# Patient Record
Sex: FEMALE | Race: WHITE | URBAN - METROPOLITAN AREA
[De-identification: names, ages, dates, MRNs, and addresses within clinical notes are randomized per-mention and may not be internally consistent; named-entity substitution may affect disease eponyms.]

---

## 2022-07-09 ENCOUNTER — TELEPHONE ENCOUNTER (OUTPATIENT)
Dept: URBAN - METROPOLITAN AREA CLINIC 121 | Facility: CLINIC | Age: 72
End: 2022-07-09

## 2022-07-09 RX ORDER — MV-MIN/FOLIC/VIT K/LYCOP/COQ10 200-100MCG
CAPSULE ORAL
Refills: 0 | OUTPATIENT
Start: 2016-02-08 | End: 2016-04-21

## 2022-07-09 RX ORDER — BISMUTH SUBCITRATE POTASSIUM, METRONIDAZOLE, TETRACYCLINE HYDROCHLORIDE 140; 125; 125 MG/1; MG/1; MG/1
TAKE AS DIRECTED CAPSULE ORAL TAKE AS DIRECTED
Refills: 0 | OUTPATIENT
Start: 2016-07-19 | End: 2016-09-07

## 2022-07-09 RX ORDER — OMEPRAZOLE 20 MG/1
TABLET, DELAYED RELEASE ORAL TWICE A DAY
Refills: 0 | OUTPATIENT
Start: 2018-03-05 | End: 2018-06-12

## 2022-07-09 RX ORDER — OMEPRAZOLE 20 MG/1
ONCE A DAY CAPSULE, DELAYED RELEASE ORAL ONCE A DAY
Refills: 1 | OUTPATIENT
Start: 2016-02-08 | End: 2016-04-01

## 2022-07-09 RX ORDER — MV-MIN/FOLIC/VIT K/LYCOP/COQ10 200-100MCG
CAPSULE ORAL
Refills: 0 | OUTPATIENT
Start: 2016-04-21 | End: 2016-07-11

## 2022-07-09 RX ORDER — OMEPRAZOLE 20 MG/1
ONCE A DAY CAPSULE, DELAYED RELEASE ORAL ONCE A DAY
Refills: 0 | OUTPATIENT
Start: 2016-09-07 | End: 2018-03-05

## 2022-07-09 RX ORDER — DM/PE/ACETAMINOPHEN/CHLORPHENR 10-5-325-2
TABLET, SEQUENTIAL ORAL
Refills: 0 | OUTPATIENT
Start: 2016-04-21 | End: 2016-07-11

## 2022-07-09 RX ORDER — OMEPRAZOLE 20 MG/1
ONCE A DAY CAPSULE, DELAYED RELEASE ORAL ONCE A DAY
Refills: 1 | OUTPATIENT
Start: 2016-04-01 | End: 2016-09-07

## 2022-07-09 RX ORDER — BISMUTH SUBCITRATE POTASSIUM, METRONIDAZOLE, TETRACYCLINE HYDROCHLORIDE 140; 125; 125 MG/1; MG/1; MG/1
CAPSULE ORAL
Refills: 0 | OUTPATIENT
Start: 2016-09-07 | End: 2016-12-27

## 2022-07-09 RX ORDER — DM/PE/ACETAMINOPHEN/CHLORPHENR 10-5-325-2
TABLET, SEQUENTIAL ORAL
Refills: 0 | OUTPATIENT
Start: 2016-02-08 | End: 2016-04-21

## 2022-07-09 RX ORDER — OMEPRAZOLE 20 MG/1
TABLET, DELAYED RELEASE ORAL
Refills: 0 | OUTPATIENT
Start: 2016-07-11 | End: 2016-09-07

## 2022-07-09 RX ORDER — OMEPRAZOLE 20 MG/1
TABLET, DELAYED RELEASE ORAL
Refills: 0 | OUTPATIENT
Start: 2016-04-21 | End: 2016-07-11

## 2022-07-10 ENCOUNTER — TELEPHONE ENCOUNTER (OUTPATIENT)
Dept: URBAN - METROPOLITAN AREA CLINIC 121 | Facility: CLINIC | Age: 72
End: 2022-07-10

## 2022-07-10 RX ORDER — OMEPRAZOLE 20 MG/1
PRN TABLET, DELAYED RELEASE ORAL
Refills: 0 | Status: ACTIVE | COMMUNITY
Start: 2018-06-12

## 2023-10-04 ENCOUNTER — OFFICE VISIT (OUTPATIENT)
Dept: URBAN - METROPOLITAN AREA CLINIC 60 | Facility: CLINIC | Age: 73
End: 2023-10-04
Payer: COMMERCIAL

## 2023-10-04 ENCOUNTER — WEB ENCOUNTER (OUTPATIENT)
Dept: URBAN - METROPOLITAN AREA CLINIC 60 | Facility: CLINIC | Age: 73
End: 2023-10-04

## 2023-10-04 VITALS
SYSTOLIC BLOOD PRESSURE: 140 MMHG | BODY MASS INDEX: 26.8 KG/M2 | RESPIRATION RATE: 20 BRPM | WEIGHT: 157 LBS | HEART RATE: 69 BPM | TEMPERATURE: 97.9 F | DIASTOLIC BLOOD PRESSURE: 80 MMHG | OXYGEN SATURATION: 93 % | HEIGHT: 64 IN

## 2023-10-04 DIAGNOSIS — K51.80 OTHER ULCERATIVE COLITIS WITHOUT COMPLICATION: ICD-10-CM

## 2023-10-04 DIAGNOSIS — D12.6 ADENOMATOUS POLYP OF COLON, UNSPECIFIED PART OF COLON: ICD-10-CM

## 2023-10-04 DIAGNOSIS — K57.30 DIVERTICULAR DISEASE OF COLON: ICD-10-CM

## 2023-10-04 DIAGNOSIS — Z86.010 PERSONAL HISTORY OF COLONIC POLYPS: ICD-10-CM

## 2023-10-04 PROBLEM — 733657002: Status: ACTIVE | Noted: 2023-10-04

## 2023-10-04 PROBLEM — 64766004: Status: ACTIVE | Noted: 2023-10-04

## 2023-10-04 PROCEDURE — 99204 OFFICE O/P NEW MOD 45 MIN: CPT | Performed by: NURSE PRACTITIONER

## 2023-10-04 RX ORDER — MESALAMINE 375 MG/1
4 CAPSULES IN THE MORNING CAPSULE, EXTENDED RELEASE ORAL ONCE A DAY
Qty: 360 CAPSULE | Refills: 0 | OUTPATIENT
Start: 2023-10-04 | End: 2023-11-03

## 2023-10-04 RX ORDER — MESALAMINE 375 MG/1
4 CAPSULES IN THE MORNING CAPSULE, EXTENDED RELEASE ORAL ONCE A DAY
Status: ACTIVE | COMMUNITY

## 2023-10-04 RX ORDER — ALENDRONATE SODIUM 70 MG/1
1 TABLET 30 MINUTES BEFORE THE FIRST FOOD, BEVERAGE OR MEDICINE OF THE DAY WITH PLAIN WATER TABLET ORAL
Status: ACTIVE | COMMUNITY

## 2023-10-04 RX ORDER — OMEPRAZOLE 20 MG/1
PRN TABLET, DELAYED RELEASE ORAL
Refills: 0 | Status: ACTIVE | COMMUNITY
Start: 2018-06-12

## 2023-10-04 RX ORDER — MEMANTINE HYDROCHLORIDE 5 MG/1
1 TABLET TABLET ORAL ONCE A DAY
Status: ACTIVE | COMMUNITY

## 2023-10-04 NOTE — PHYSICAL EXAM HENT:
Head,  normocephalic,  atraumatic,  Face, within normal limits,  Ears,  External ears within normal limits,  Nose/Nasopharynx,  External nose  normal appearance, no nasal discharge,  Mouth and Throat,  Oral cavity appearance normal. Mucosa normal. Lips,  Appearance normal 
Clothing

## 2023-10-04 NOTE — HPI-TODAY'S VISIT:
Patient with colonoscopy positive for tubulovillous adenoma in the rectum, diverticular disease, proctitis and internal hemorrhoids, random biopsy of the  colon negative for colitis. Patient EGD positive for H hernia, reflux and H Pylori gastritis. Today good general state, with the complaint above. Patient with allergy to penicillin so will do treatment with Pylera and labs. Patient with likely IBD will need surveillance colonoscopy in 1-2 years.  7/16:  Patient with evidence of proctitis will need labs of IBD and will continue for now with Pentasa, will do labs and will follow closely, will need a colonoscopy in 1 year from the last one. Patient with gastritis positive for H pylori, did not take her treatment will try again, will follow in 2 months.  9/16:  Patient  Comes with improvement of her symptoms after H pylori treatment, will do urea breath test and will follow results, patient denies diarrhea or bleeding having regular bowel moments. Will follow in 2 months.  12/16 Patient had urea breath test that is negative. Patient doing well, will decrease dose of omeprazole. Will follow in few months.Will plan colonoscopy. Will give treatment for UC.  3/18: Patient states doing well with prn PPI, and on mesalamine. But did not have a colonoscopy as recommended on 2016 and had a test of occult blood that is positive. Will plan colonoscopy.  Will consider Lialda. Patient with low dose.  6/18:  Patient had a colonoscopy with evidence of colitis at the sigmoid colon, normal mucosa at the rectum and in the rest of the colon, hyperplastic polyp, diverticulosis and internal hemorrhoids. Patient had IBD panel that is negative.  Patient denies abdominal pain, mucus or blood in stool. Will continue low dose of mesalamine 400 mg daily will repeat a colonoscopy in 2 years.  3/18:  Patient states doing well with prn PPI, and on mesalamine. But did not have a colonoscopy as recommended on 2016 and had a test of occult blood that is positive. Will plan colonoscopy.  Will consider Lialda. Patient with low dose. Discussion [Use for free text]. Discussed dietary restrictions pertaining to Gastritis Information sheet reviewed and a copy provided. Discussed the need for appropriate follow-up care.  Patient will be placed in our recall system and a letter will be mailed to the patient. 10/23 Patient is here today in good general state, she is here for her surveillance colonoscopy her last colonoscopy was on 2018.  Patient has medical history of hyperplastic and tubulovillous adenoma of the rectum.  She has medical history of ulcerative colitis on Apriso 0.375 GM.  Today she is in good general state, denies any rectal bleeding, abdominal pain, diarrhea or any other GI symptoms.

## 2023-10-11 ENCOUNTER — LAB OUTSIDE AN ENCOUNTER (OUTPATIENT)
Dept: URBAN - METROPOLITAN AREA CLINIC 60 | Facility: CLINIC | Age: 73
End: 2023-10-11

## 2023-11-16 LAB
A/G RATIO: 1.3
ABSOLUTE BASOPHILS: 40
ABSOLUTE EOSINOPHILS: 304
ABSOLUTE LYMPHOCYTES: 2317
ABSOLUTE MONOCYTES: 521
ABSOLUTE NEUTROPHILS: 3419
ALBUMIN: 4.2
ALKALINE PHOSPHATASE: 59
ALT (SGPT): 8
AST (SGOT): 15
BASOPHILS: 0.6
BILIRUBIN, TOTAL: 0.9
BUN/CREATININE RATIO: 32
BUN: 17
CALCIUM: 9.6
CARBON DIOXIDE, TOTAL: 30
CHLORIDE: 104
CREATININE: 0.53
EGFR: 98
EOSINOPHILS: 4.6
GLOBULIN, TOTAL: 3.3
GLUCOSE: 104
HEMATOCRIT: 42.3
HEMOGLOBIN: 13.7
LYMPHOCYTES: 35.1
MCH: 28.6
MCHC: 32.4
MCV: 88.3
MONOCYTES: 7.9
MPV: 9.7
NEUTROPHILS: 51.8
PLATELET COUNT: 309
POTASSIUM: 4.5
PROTEIN, TOTAL: 7.5
RDW: 13.1
RED BLOOD CELL COUNT: 4.79
SODIUM: 141
WHITE BLOOD CELL COUNT: 6.6

## 2023-12-08 ENCOUNTER — OFFICE VISIT (OUTPATIENT)
Dept: URBAN - METROPOLITAN AREA SURGERY CENTER 4 | Facility: SURGERY CENTER | Age: 73
End: 2023-12-08
Payer: COMMERCIAL

## 2023-12-08 DIAGNOSIS — K51.30 CHRONIC ULCERATIVE PROCTOSIGMOIDITIS, WITHOUT COMPLICATIONS: ICD-10-CM

## 2023-12-08 DIAGNOSIS — K63.5 POLYP OF DESCENDING COLON, UNSPECIFIED TYPE: ICD-10-CM

## 2023-12-08 DIAGNOSIS — K57.30 DIVERTICULA, COLON: ICD-10-CM

## 2023-12-08 DIAGNOSIS — Z86.010 ADENOMAS PERSONAL HISTORY OF COLONIC POLYPS: ICD-10-CM

## 2023-12-08 DIAGNOSIS — K64.1 SECOND DEGREE HEMORRHOIDS: ICD-10-CM

## 2023-12-08 DIAGNOSIS — K63.89 OTHER SPECIFIED DISEASES OF INTESTINE: ICD-10-CM

## 2023-12-08 DIAGNOSIS — K63.5 BENIGN COLON POLYPS: ICD-10-CM

## 2023-12-08 DIAGNOSIS — K57.30 DIVERTICULOSIS OF LARGE INTESTINE WITHOUT PERFORATION OR ABSCESS WITHOUT BLEEDING: ICD-10-CM

## 2023-12-08 PROCEDURE — 45380 COLONOSCOPY AND BIOPSY: CPT | Performed by: INTERNAL MEDICINE

## 2023-12-08 PROCEDURE — 45380 COLONOSCOPY AND BIOPSY: CPT | Performed by: CLINIC/CENTER

## 2023-12-08 PROCEDURE — 00811 ANES LWR INTST NDSC NOS: CPT | Performed by: NURSE ANESTHETIST, CERTIFIED REGISTERED

## 2023-12-08 PROCEDURE — 45385 COLONOSCOPY W/LESION REMOVAL: CPT | Performed by: CLINIC/CENTER

## 2023-12-08 PROCEDURE — 45385 COLONOSCOPY W/LESION REMOVAL: CPT | Performed by: INTERNAL MEDICINE

## 2023-12-08 RX ORDER — ALENDRONATE SODIUM 70 MG/1
1 TABLET 30 MINUTES BEFORE THE FIRST FOOD, BEVERAGE OR MEDICINE OF THE DAY WITH PLAIN WATER TABLET ORAL
Status: ACTIVE | COMMUNITY

## 2023-12-08 RX ORDER — MESALAMINE 375 MG/1
4 CAPSULES IN THE MORNING CAPSULE, EXTENDED RELEASE ORAL ONCE A DAY
Status: ACTIVE | COMMUNITY

## 2023-12-08 RX ORDER — MEMANTINE HYDROCHLORIDE 5 MG/1
1 TABLET TABLET ORAL ONCE A DAY
Status: ACTIVE | COMMUNITY

## 2023-12-08 RX ORDER — OMEPRAZOLE 20 MG/1
PRN TABLET, DELAYED RELEASE ORAL
Refills: 0 | Status: ACTIVE | COMMUNITY
Start: 2018-06-12

## 2023-12-26 ENCOUNTER — DASHBOARD ENCOUNTERS (OUTPATIENT)
Age: 73
End: 2023-12-26

## 2023-12-26 ENCOUNTER — OFFICE VISIT (OUTPATIENT)
Dept: URBAN - METROPOLITAN AREA CLINIC 60 | Facility: CLINIC | Age: 73
End: 2023-12-26
Payer: COMMERCIAL

## 2023-12-26 VITALS
OXYGEN SATURATION: 95 % | BODY MASS INDEX: 26.63 KG/M2 | RESPIRATION RATE: 20 BRPM | TEMPERATURE: 97.8 F | DIASTOLIC BLOOD PRESSURE: 66 MMHG | WEIGHT: 156 LBS | SYSTOLIC BLOOD PRESSURE: 128 MMHG | HEART RATE: 74 BPM | HEIGHT: 64 IN

## 2023-12-26 DIAGNOSIS — K57.30 DIVERTICULAR DISEASE OF COLON: ICD-10-CM

## 2023-12-26 DIAGNOSIS — K51.80 OTHER ULCERATIVE COLITIS WITHOUT COMPLICATION: ICD-10-CM

## 2023-12-26 DIAGNOSIS — Z86.010 PERSONAL HISTORY OF COLONIC POLYPS: ICD-10-CM

## 2023-12-26 DIAGNOSIS — K64.1 GRADE II HEMORRHOIDS: ICD-10-CM

## 2023-12-26 PROCEDURE — 99214 OFFICE O/P EST MOD 30 MIN: CPT | Performed by: NURSE PRACTITIONER

## 2023-12-26 RX ORDER — OMEPRAZOLE 20 MG/1
PRN TABLET, DELAYED RELEASE ORAL
Refills: 0 | Status: ACTIVE | COMMUNITY
Start: 2018-06-12

## 2023-12-26 RX ORDER — MESALAMINE 375 MG/1
4 CAPSULES IN THE MORNING CAPSULE, EXTENDED RELEASE ORAL ONCE A DAY
Status: ACTIVE | COMMUNITY

## 2023-12-26 RX ORDER — MEMANTINE HYDROCHLORIDE 5 MG/1
1 TABLET TABLET ORAL ONCE A DAY
Status: ACTIVE | COMMUNITY

## 2023-12-26 RX ORDER — MESALAMINE 375 MG/1
4 CAPSULES IN THE MORNING CAPSULE, EXTENDED RELEASE ORAL ONCE A DAY
Qty: 120 CAPSULES | Refills: 5 | OUTPATIENT
Start: 2023-12-27 | End: 2024-06-24

## 2023-12-26 NOTE — HPI-TODAY'S VISIT:
Patient with colonoscopy positive for tubulovillous adenoma in the rectum, diverticular disease, proctitis and internal hemorrhoids, random biopsy of the  colon negative for colitis. Patient EGD positive for H hernia, reflux and H Pylori gastritis. Today good general state, with the complaint above. Patient with allergy to penicillin so will do treatment with Pylera and labs. Patient with likely IBD will need surveillance colonoscopy in 1-2 years.  7/16:  Patient with evidence of proctitis will need labs of IBD and will continue for now with Pentasa, will do labs and will follow closely, will need a colonoscopy in 1 year from the last one. Patient with gastritis positive for H pylori, did not take her treatment will try again, will follow in 2 months.  9/16:  Patient  Comes with improvement of her symptoms after H pylori treatment, will do urea breath test and will follow results, patient denies diarrhea or bleeding having regular bowel moments. Will follow in 2 months.  12/16 Patient had urea breath test that is negative. Patient doing well, will decrease dose of omeprazole. Will follow in few months.Will plan colonoscopy. Will give treatment for UC.  3/18: Patient states doing well with prn PPI, and on mesalamine. But did not have a colonoscopy as recommended on 2016 and had a test of occult blood that is positive. Will plan colonoscopy.  Will consider Lialda. Patient with low dose.  6/18:  Patient had a colonoscopy with evidence of colitis at the sigmoid colon, normal mucosa at the rectum and in the rest of the colon, hyperplastic polyp, diverticulosis and internal hemorrhoids. Patient had IBD panel that is negative.  Patient denies abdominal pain, mucus or blood in stool. Will continue low dose of mesalamine 400 mg daily will repeat a colonoscopy in 2 years.  3/18:  Patient states doing well with prn PPI, and on mesalamine. But did not have a colonoscopy as recommended on 2016 and had a test of occult blood that is positive. Will plan colonoscopy.  Will consider Lialda. Patient with low dose. Discussion [Use for free text]. Discussed dietary restrictions pertaining to Gastritis Information sheet reviewed and a copy provided. Discussed the need for appropriate follow-up care.  Patient will be placed in our recall system and a letter will be mailed to the patient. 10/23 Patient is here today in good general state, she is here for her surveillance colonoscopy her last colonoscopy was on 2018.  Patient has medical history of hyperplastic and tubulovillous adenoma of the rectum.  She has medical history of ulcerative colitis on Apriso 0.375 GM.  Today she is in good general state, denies any rectal bleeding, abdominal pain, diarrhea or any other GI symptoms. 12/23 Patient colonoscopy shows evidence of an 8 mm hyperplastic polyp into the descending colon, three 5 mm tubular adenoma polyp into the cecum, five 5 to 9 mm tubular adenoma polyp in the transverse colon, two 8 mm inflammatory polyp in the sigmoid colon, moderate diverticular disease of the sigmoid colon, and internal hemorrhoids.  Altered vascular, congested, erythematous, eroded mucosa in the rectosigmoid.  Biopsy results shows evidence of rectosigmoid colon biopsy colonic mucosa with focal active chronic colitis, negative for granuloma, dysplasia, or malignancy.

## 2024-06-25 ENCOUNTER — OFFICE VISIT (OUTPATIENT)
Dept: URBAN - METROPOLITAN AREA CLINIC 60 | Facility: CLINIC | Age: 74
End: 2024-06-25
Payer: MEDICARE

## 2024-06-25 ENCOUNTER — OFFICE VISIT (OUTPATIENT)
Dept: URBAN - METROPOLITAN AREA CLINIC 60 | Facility: CLINIC | Age: 74
End: 2024-06-25

## 2024-06-25 VITALS
DIASTOLIC BLOOD PRESSURE: 80 MMHG | HEART RATE: 83 BPM | RESPIRATION RATE: 20 BRPM | BODY MASS INDEX: 27.31 KG/M2 | HEIGHT: 64 IN | WEIGHT: 160 LBS | OXYGEN SATURATION: 94 % | TEMPERATURE: 97.1 F | SYSTOLIC BLOOD PRESSURE: 122 MMHG

## 2024-06-25 DIAGNOSIS — K57.30 DIVERTICULAR DISEASE OF COLON: ICD-10-CM

## 2024-06-25 DIAGNOSIS — Z86.010 PERSONAL HISTORY OF COLONIC POLYPS: ICD-10-CM

## 2024-06-25 DIAGNOSIS — K64.1 GRADE II HEMORRHOIDS: ICD-10-CM

## 2024-06-25 DIAGNOSIS — K51.80 OTHER ULCERATIVE COLITIS WITHOUT COMPLICATION: ICD-10-CM

## 2024-06-25 PROCEDURE — 99214 OFFICE O/P EST MOD 30 MIN: CPT | Performed by: NURSE PRACTITIONER

## 2024-06-25 RX ORDER — MESALAMINE 375 MG/1
2 CAPSULES IN THE MORNING CAPSULE, EXTENDED RELEASE ORAL ONCE A DAY
Qty: 180 CAPSULE | Refills: 0 | OUTPATIENT
Start: 2024-06-25 | End: 2024-09-23

## 2024-06-25 RX ORDER — MESALAMINE 375 MG/1
4 CAPSULES IN THE MORNING CAPSULE, EXTENDED RELEASE ORAL ONCE A DAY
Status: ACTIVE | COMMUNITY

## 2024-06-25 RX ORDER — MEMANTINE HYDROCHLORIDE 5 MG/1
1 TABLET TABLET ORAL ONCE A DAY
Status: ACTIVE | COMMUNITY

## 2024-06-25 NOTE — HPI-TODAY'S VISIT:
Patient with colonoscopy positive for tubulovillous adenoma in the rectum, diverticular disease, proctitis and internal hemorrhoids, random biopsy of the  colon negative for colitis. Patient EGD positive for H hernia, reflux and H Pylori gastritis. Today good general state, with the complaint above. Patient with allergy to penicillin so will do treatment with Pylera and labs. Patient with likely IBD will need surveillance colonoscopy in 1-2 years.  7/16:  Patient with evidence of proctitis will need labs of IBD and will continue for now with Pentasa, will do labs and will follow closely, will need a colonoscopy in 1 year from the last one. Patient with gastritis positive for H pylori, did not take her treatment will try again, will follow in 2 months.  9/16:  Patient  Comes with improvement of her symptoms after H pylori treatment, will do urea breath test and will follow results, patient denies diarrhea or bleeding having regular bowel moments. Will follow in 2 months.  12/16 Patient had urea breath test that is negative. Patient doing well, will decrease dose of omeprazole. Will follow in few months.Will plan colonoscopy. Will give treatment for UC.  3/18: Patient states doing well with prn PPI, and on mesalamine. But did not have a colonoscopy as recommended on 2016 and had a test of occult blood that is positive. Will plan colonoscopy.  Will consider Lialda. Patient with low dose.  6/18:  Patient had a colonoscopy with evidence of colitis at the sigmoid colon, normal mucosa at the rectum and in the rest of the colon, hyperplastic polyp, diverticulosis and internal hemorrhoids. Patient had IBD panel that is negative.  Patient denies abdominal pain, mucus or blood in stool. Will continue low dose of mesalamine 400 mg daily will repeat a colonoscopy in 2 years.  3/18:  Patient states doing well with prn PPI, and on mesalamine. But did not have a colonoscopy as recommended on 2016 and had a test of occult blood that is positive. Will plan colonoscopy.  Will consider Lialda. Patient with low dose. Discussion [Use for free text]. Discussed dietary restrictions pertaining to Gastritis Information sheet reviewed and a copy provided. Discussed the need for appropriate follow up care.  Patient will be placed in our recall system and a letter will be mailed to the patient. 10/23 Patient is here today in good general state, she is here for her surveillance colonoscopy her last colonoscopy was on 2018.  Patient has medical history of hyperplastic and tubulovillous adenoma of the rectum.  She has medical history of ulcerative colitis on Apriso 0.375 GM.  Today she is in good general state, denies any rectal bleeding, abdominal pain, diarrhea or any other GI symptoms. 12/23 Patient colonoscopy shows evidence of an 8 mm hyperplastic polyp into the descending colon, three 5 mm tubular adenoma polyp into the cecum, five 5 to 9 mm tubular adenoma polyp in the transverse colon, two 8 mm inflammatory polyp in the sigmoid colon, moderate diverticular disease of the sigmoid colon, and internal hemorrhoids.  Altered vascular, congested, erythematous, eroded mucosa in the rectosigmoid.  Biopsy results show evidence of rectosigmoid colon biopsy colonic mucosa with focal active chronic colitis, negative for granuloma, dysplasia, or malignancy. 6/24 Patient here today in good general state she denies any GI symptoms.  She has medical history of ulcerative colitis her last colonoscopy was done 6 months ago positive for adenoma polyp of the colon her next recall will be in 6 months.  Patient on Apriso 2 capsules in AM.  Patient had laboratories to check on liver and kidney function, colonoscopy in 6 months.  Continue with Apriso 2 caps daily.

## 2024-06-28 LAB
A/G RATIO: 1.3
ALBUMIN: 4.1
ALKALINE PHOSPHATASE: 60
ALT (SGPT): 9
AST (SGOT): 15
BILIRUBIN, TOTAL: 0.9
BUN/CREATININE RATIO: 27
BUN: 16
CALCIUM: 9.4
CARBON DIOXIDE, TOTAL: 34
CHLORIDE: 104
CREATININE: 0.59
EGFR: 95
GLOBULIN, TOTAL: 3.2
GLUCOSE: 86
HEMATOCRIT: 42.3
HEMOGLOBIN: 13.6
MCH: 28.8
MCHC: 32.2
MCV: 89.6
MPV: 9.2
PLATELET COUNT: 309
POTASSIUM: 5
PROTEIN, TOTAL: 7.3
RDW: 13.2
RED BLOOD CELL COUNT: 4.72
SODIUM: 141
WHITE BLOOD CELL COUNT: 6.2

## 2024-08-08 ENCOUNTER — TELEPHONE ENCOUNTER (OUTPATIENT)
Dept: URBAN - METROPOLITAN AREA CLINIC 63 | Facility: CLINIC | Age: 74
End: 2024-08-08

## 2024-08-08 PROBLEM — 64766004: Status: ACTIVE | Noted: 2024-08-08

## 2024-08-08 RX ORDER — MESALAMINE 1.2 G/1
2 TABLETS WITH A MEAL TABLET, DELAYED RELEASE ORAL ONCE A DAY
Qty: 180 TABLET | Refills: 0 | OUTPATIENT
Start: 2024-08-08 | End: 2024-11-05

## 2024-08-22 ENCOUNTER — OFFICE VISIT (OUTPATIENT)
Dept: URBAN - METROPOLITAN AREA CLINIC 60 | Facility: CLINIC | Age: 74
End: 2024-08-22
Payer: COMMERCIAL

## 2024-08-22 VITALS
TEMPERATURE: 97.5 F | HEIGHT: 64 IN | HEART RATE: 60 BPM | RESPIRATION RATE: 20 BRPM | DIASTOLIC BLOOD PRESSURE: 80 MMHG | OXYGEN SATURATION: 97 % | WEIGHT: 161 LBS | SYSTOLIC BLOOD PRESSURE: 120 MMHG | BODY MASS INDEX: 27.49 KG/M2

## 2024-08-22 DIAGNOSIS — K51.90 ULCERATIVE COLITIS WITHOUT COMPLICATIONS, UNSPECIFIED LOCATION: ICD-10-CM

## 2024-08-22 PROCEDURE — 99214 OFFICE O/P EST MOD 30 MIN: CPT | Performed by: NURSE PRACTITIONER

## 2024-08-22 RX ORDER — MESALAMINE 375 MG/1
2 CAPSULES IN THE MORNING CAPSULE, EXTENDED RELEASE ORAL ONCE A DAY
Qty: 180 CAPSULE | Refills: 0 | Status: ACTIVE | COMMUNITY
Start: 2024-06-25 | End: 2024-09-23

## 2024-08-22 RX ORDER — MESALAMINE 1.2 G/1
2 TABLETS WITH A MEAL TABLET, DELAYED RELEASE ORAL ONCE A DAY
Qty: 180 TABLET | Refills: 0 | Status: ACTIVE | COMMUNITY
Start: 2024-08-08 | End: 2024-11-05

## 2024-08-22 RX ORDER — MEMANTINE HYDROCHLORIDE 10 MG/1
1 TABLET TABLET ORAL ONCE A DAY
Status: ACTIVE | COMMUNITY

## 2024-08-22 RX ORDER — MESALAMINE 1.2 G/1
2 TABLETS WITH A MEAL TABLET, DELAYED RELEASE ORAL ONCE A DAY
Qty: 180 TABLET | Refills: 0 | OUTPATIENT

## 2024-08-22 NOTE — HPI-TODAY'S VISIT:
Patient with colonoscopy positive for tubulovillous adenoma in the rectum, diverticular disease, proctitis and internal hemorrhoids, random biopsy of the  colon negative for colitis. Patient EGD positive for H hernia, reflux and H Pylori gastritis. Today good general state, with the complaint above. Patient with allergy to penicillin so will do treatment with Pylera and labs. Patient with likely IBD will need surveillance colonoscopy in 1 to 2 years.  7/16:  Patient with evidence of proctitis will need labs of IBD and will continue for now with Pentasa, will do labs and will follow closely, will need a colonoscopy in 1 year from the last one. Patient with gastritis positive for H pylori, did not take her treatment will try again, will follow in 2 months.  9/16:  Patient  Comes with improvement of her symptoms after H pylori treatment, will do urea breath test and will follow results, patient denies diarrhea or bleeding having regular bowel moments. Will follow in 2 months.  12/16 Patient had urea breath test that is negative. Patient doing well, will decrease dose of omeprazole. Will follow in few months.Will plan colonoscopy. Will give treatment for UC.  3/18: Patient states doing well with prn PPI, and on mesalamine. But did not have a colonoscopy as recommended on 2016 and had a test of occult blood that is positive. Will plan colonoscopy.  Will consider Lialda. Patient with low dose.  6/18:  Patient had a colonoscopy with evidence of colitis at the sigmoid colon, normal mucosa at the rectum and in the rest of the colon, hyperplastic polyp, diverticulosis and internal hemorrhoids. Patient had IBD panel that is negative.  Patient denies abdominal pain, mucus or blood in stool. Will continue low dose of mesalamine 400 mg daily will repeat a colonoscopy in 2 years.  3/18:  Patient states doing well with prn PPI, and on mesalamine. But did not have a colonoscopy as recommended on 2016 and had a test of occult blood that is positive. Will plan colonoscopy.  Will consider Lialda. Patient with low dose. Discussion [Use for free text]. Discussed dietary restrictions pertaining to Gastritis Information sheet reviewed and a copy provided. Discussed the need for appropriate follow-up care.  Patient will be placed in our recall system and a letter will be mailed to the patient. 10/23 Patient is here today in good general state, she is here for her surveillance colonoscopy her last colonoscopy was on 2018.  Patient has medical history of hyperplastic and tubulovillous adenoma of the rectum.  She has medical history of ulcerative colitis on Apriso 0.375 GM.  Today she is in good general state, denies any rectal bleeding, abdominal pain, diarrhea or any other GI symptoms. 12/23 Patient colonoscopy shows evidence of an 8 mm hyperplastic polyp into the descending colon, three 5 mm tubular adenoma polyp into the cecum, five 5 to 9 mm tubular adenoma polyp in the transverse colon, two 8 mm inflammatory polyp in the sigmoid colon, moderate diverticular disease of the sigmoid colon, and internal hemorrhoids.  Altered vascular, congested, erythematous, eroded mucosa in the rectosigmoid.  Biopsy results show evidence of rectosigmoid colon biopsy colonic mucosa with focal active chronic colitis, negative for granuloma, dysplasia, or malignancy. 6/24 Patient here today in good general state she denies any GI symptoms.  She has medical history of ulcerative colitis her last colonoscopy was done 6 months ago positive for adenoma polyp of the colon her next recall will be in 6 months.  Patient on Apriso 2 capsules in AM. Patient had laboratories to check on liver and kidney function, colonoscopy in 6 months.  Continue with Apriso 2 caps daily. 8/24 Patient here today with parents state she denies any GI bleeding, abdominal pain, diarrhea, constipation rectal pain or any other GI symptoms.  Patient laboratory shows evidence of normal kidney functions, normal liver functions, and normal CBC.  I see patient in 4 months to schedule her next colonoscopy.

## 2024-12-16 ENCOUNTER — OFFICE VISIT (OUTPATIENT)
Dept: URBAN - METROPOLITAN AREA CLINIC 60 | Facility: CLINIC | Age: 74
End: 2024-12-16
Payer: MEDICARE

## 2024-12-16 ENCOUNTER — LAB OUTSIDE AN ENCOUNTER (OUTPATIENT)
Dept: URBAN - METROPOLITAN AREA CLINIC 60 | Facility: CLINIC | Age: 74
End: 2024-12-16

## 2024-12-16 VITALS
HEART RATE: 63 BPM | SYSTOLIC BLOOD PRESSURE: 118 MMHG | RESPIRATION RATE: 20 BRPM | TEMPERATURE: 97.4 F | OXYGEN SATURATION: 95 % | HEIGHT: 64 IN | BODY MASS INDEX: 27.83 KG/M2 | DIASTOLIC BLOOD PRESSURE: 76 MMHG | WEIGHT: 163 LBS

## 2024-12-16 DIAGNOSIS — K21.9 ACID REFLUX: ICD-10-CM

## 2024-12-16 DIAGNOSIS — R19.7 ACUTE DIARRHEA: ICD-10-CM

## 2024-12-16 DIAGNOSIS — K51.90 ACUTE ULCERATIVE COLITIS: ICD-10-CM

## 2024-12-16 PROBLEM — 235595009: Status: ACTIVE | Noted: 2024-12-16

## 2024-12-16 PROCEDURE — 99214 OFFICE O/P EST MOD 30 MIN: CPT | Performed by: NURSE PRACTITIONER

## 2024-12-16 RX ORDER — MEMANTINE HYDROCHLORIDE 10 MG/1
1 TABLET TABLET ORAL ONCE A DAY
Status: ACTIVE | COMMUNITY

## 2024-12-16 RX ORDER — MESALAMINE 1.2 G/1
2 TABLETS WITH A MEAL TABLET, DELAYED RELEASE ORAL ONCE A DAY
Qty: 180 TABLET | Refills: 0 | Status: ACTIVE | COMMUNITY

## 2024-12-16 RX ORDER — MESALAMINE 1.2 G/1
2 TABLETS WITH A MEAL TABLET, DELAYED RELEASE ORAL ONCE A DAY
Qty: 180 TABLET | Refills: 0 | OUTPATIENT

## 2024-12-16 RX ORDER — FAMOTIDINE 40 MG/1
1 TABLET AT BEDTIME AS NEEDED TABLET, FILM COATED ORAL ONCE A DAY
Qty: 30 | Refills: 2 | OUTPATIENT
Start: 2024-12-16

## 2024-12-16 NOTE — HPI-TODAY'S VISIT:
Patient with colonoscopy positive for tubulovillous adenoma in the rectum, diverticular disease, proctitis and internal hemorrhoids, random biopsy of the  colon negative for colitis. Patient EGD positive for H hernia, reflux and H Pylori gastritis. Today good general state, with the complaint above. Patient with allergy to penicillin so will do treatment with Pylera and labs. Patient with likely IBD will need surveillance colonoscopy in 1 to 2 years.  7/16:  Patient with evidence of proctitis will need labs of IBD and will continue for now with Pentasa, will do labs and will follow closely, will need a colonoscopy in 1 year from the last one. Patient with gastritis positive for H pylori, did not take her treatment will try again, will follow in 2 months.  9/16:  Patient  Comes with improvement of her symptoms after H pylori treatment, will do urea breath test and will follow results, patient denies diarrhea or bleeding having regular bowel moments. Will follow in 2 months.  12/16 Patient had urea breath test that is negative. Patient doing well, will decrease dose of omeprazole. Will follow in few months.Will plan colonoscopy. Will give treatment for UC.  3/18: Patient states doing well with prn PPI, and on mesalamine. But did not have a colonoscopy as recommended on 2016 and had a test of occult blood that is positive. Will plan colonoscopy.  Will consider Lialda. Patient with low dose.  6/18:  Patient had a colonoscopy with evidence of colitis at the sigmoid colon, normal mucosa at the rectum and in the rest of the colon, hyperplastic polyp, diverticulosis and internal hemorrhoids. Patient had IBD panel that is negative.  Patient denies abdominal pain, mucus or blood in stool. Will continue low dose of mesalamine 400 mg daily will repeat a colonoscopy in 2 years.  3/18:  Patient states doing well with prn PPI, and on mesalamine. But did not have a colonoscopy as recommended on 2016 and had a test of occult blood that is positive. Will plan colonoscopy.  Will consider Lialda. Patient with low dose. Discussion [Use for free text]. Discussed dietary restrictions pertaining to Gastritis Information sheet reviewed and a copy provided. Discussed the need for appropriate follow-up care.  Patient will be placed in our recall system and a letter will be mailed to the patient. 10/23 Patient is here today in good general state, she is here for her surveillance colonoscopy her last colonoscopy was on 2018.  Patient has medical history of hyperplastic and tubulovillous adenoma of the rectum.  She has medical history of ulcerative colitis on Apriso 0.375 GM.  Today she is in good general state, denies any rectal bleeding, abdominal pain, diarrhea or any other GI symptoms. 12/23 Patient colonoscopy shows evidence of an 8 mm hyperplastic polyp into the descending colon, three 5 mm tubular adenoma polyp into the cecum, five 5 to 9 mm tubular adenoma polyp in the transverse colon, two 8 mm inflammatory polyp in the sigmoid colon, moderate diverticular disease of the sigmoid colon, and internal hemorrhoids.  Altered vascular, congested, erythematous, eroded mucosa in the rectosigmoid.  Biopsy results show evidence of rectosigmoid colon biopsy colonic mucosa with focal active chronic colitis, negative for granuloma, dysplasia, or malignancy. 6/24 Patient here today in good general state she denies any GI symptoms.  She has medical history of ulcerative colitis her last colonoscopy was done 6 months ago positive for adenoma polyp of the colon her next recall will be in 6 months.  Patient on Apriso 2 capsules in AM. Patient had laboratories to check on liver and kidney function, colonoscopy in 6 months.  Continue with Apriso 2 caps daily. 8/24 Patient here today with parents state she denies any GI bleeding, abdominal pain, diarrhea, constipation rectal pain or any other GI symptoms.  Patient laboratory shows evidence of normal kidney functions, normal liver functions, and normal CBC.  I see patient in 4 months to schedule her next colonoscopy.  12/24 Patient here today in good general state she is complaining having diarrhea are on and off mainly in the morning time negative for abdominal pain, blood, mucus, fever or any other GI symptoms.  Patient also complaining of symptoms of gastritis and reflux.  Her symptoms are chronic but now seems to be worse.  Patient has medical history of hiatal hernia GERD H. pylori s/p treatment.  Also, medical history of ulcerative colitis and colon tubular adenoma polyps. Patient will do diet for GERD start on famotidine. EGD. Colonoscopy. Diarrhea workup. We are going to do laboratories to check on TB, hepatitis B, kidney function, CBC.  We may change her to [place] open patient clinical course, and studies results.

## 2024-12-20 ENCOUNTER — TELEPHONE ENCOUNTER (OUTPATIENT)
Dept: URBAN - METROPOLITAN AREA CLINIC 60 | Facility: CLINIC | Age: 74
End: 2024-12-20

## 2024-12-23 ENCOUNTER — LAB OUTSIDE AN ENCOUNTER (OUTPATIENT)
Dept: URBAN - METROPOLITAN AREA CLINIC 60 | Facility: CLINIC | Age: 74
End: 2024-12-23

## 2025-03-28 ENCOUNTER — OFFICE VISIT (OUTPATIENT)
Dept: URBAN - METROPOLITAN AREA SURGERY CENTER 4 | Facility: SURGERY CENTER | Age: 75
End: 2025-03-28
Payer: MEDICARE

## 2025-03-28 DIAGNOSIS — K64.1 SECOND DEGREE HEMORRHOIDS: ICD-10-CM

## 2025-03-28 DIAGNOSIS — K29.60 OTHER GASTRITIS WITHOUT BLEEDING: ICD-10-CM

## 2025-03-28 DIAGNOSIS — K57.30 DIVERTICULOSIS OF LARGE INTESTINE WITHOUT PERFORATION OR ABSCESS WITHOUT BLEEDING: ICD-10-CM

## 2025-03-28 DIAGNOSIS — Z86.0100 PERSONAL HISTORY OF COLON POLYPS, UNSPECIFIED: ICD-10-CM

## 2025-03-28 DIAGNOSIS — K63.5 COLON POLYP: ICD-10-CM

## 2025-03-28 DIAGNOSIS — K63.89 OTHER SPECIFIED DISEASES OF INTESTINE: ICD-10-CM

## 2025-03-28 DIAGNOSIS — K44.9 DIAPHRAGMATIC HERNIA WITHOUT OBSTRUCTION OR GANGRENE: ICD-10-CM

## 2025-03-28 PROCEDURE — G9998 DOC MED RSN <3 COLON: HCPCS | Performed by: INTERNAL MEDICINE

## 2025-03-28 PROCEDURE — 45380 COLONOSCOPY AND BIOPSY: CPT | Performed by: INTERNAL MEDICINE

## 2025-03-28 PROCEDURE — 43239 EGD BIOPSY SINGLE/MULTIPLE: CPT | Performed by: INTERNAL MEDICINE

## 2025-03-28 RX ORDER — MEMANTINE HYDROCHLORIDE 10 MG/1
1 TABLET TABLET ORAL ONCE A DAY
Status: ACTIVE | COMMUNITY

## 2025-03-28 RX ORDER — MESALAMINE 1.2 G/1
2 TABLETS WITH A MEAL TABLET, DELAYED RELEASE ORAL ONCE A DAY
Qty: 180 TABLET | Refills: 0 | Status: ACTIVE | COMMUNITY

## 2025-03-28 RX ORDER — FAMOTIDINE 40 MG/1
1 TABLET AT BEDTIME AS NEEDED TABLET, FILM COATED ORAL ONCE A DAY
Qty: 30 | Refills: 2 | Status: ACTIVE | COMMUNITY
Start: 2024-12-16

## 2025-04-11 ENCOUNTER — OFFICE VISIT (OUTPATIENT)
Dept: URBAN - METROPOLITAN AREA CLINIC 60 | Facility: CLINIC | Age: 75
End: 2025-04-11

## 2025-04-16 ENCOUNTER — TELEPHONE ENCOUNTER (OUTPATIENT)
Dept: URBAN - METROPOLITAN AREA CLINIC 63 | Facility: CLINIC | Age: 75
End: 2025-04-16

## 2025-04-16 ENCOUNTER — OFFICE VISIT (OUTPATIENT)
Dept: URBAN - METROPOLITAN AREA CLINIC 60 | Facility: CLINIC | Age: 75
End: 2025-04-16
Payer: MEDICARE

## 2025-04-16 DIAGNOSIS — K51.90 ULCERATIVE COLITIS WITHOUT COMPLICATIONS, UNSPECIFIED LOCATION: ICD-10-CM

## 2025-04-16 DIAGNOSIS — K21.9 ACID REFLUX: ICD-10-CM

## 2025-04-16 PROCEDURE — 99214 OFFICE O/P EST MOD 30 MIN: CPT | Performed by: NURSE PRACTITIONER

## 2025-04-16 RX ORDER — MESALAMINE 1.2 G/1
2 TABLETS WITH A MEAL TABLET, DELAYED RELEASE ORAL ONCE A DAY
Qty: 180 TABLET | Refills: 0 | OUTPATIENT
Start: 2025-04-16

## 2025-04-16 RX ORDER — FAMOTIDINE 40 MG/1
1 TABLET AT BEDTIME AS NEEDED TABLET, FILM COATED ORAL ONCE A DAY
Qty: 90 TABLET | Refills: 0 | OUTPATIENT
Start: 2025-04-16

## 2025-04-16 RX ORDER — MEMANTINE HYDROCHLORIDE 10 MG/1
1 TABLET TABLET ORAL ONCE A DAY
Status: ACTIVE | COMMUNITY

## 2025-04-16 RX ORDER — MESALAMINE 1.2 G/1
2 TABLETS WITH A MEAL TABLET, DELAYED RELEASE ORAL ONCE A DAY
Status: ACTIVE | COMMUNITY

## 2025-04-16 NOTE — HPI-TODAY'S VISIT:
Patient with colonoscopy positive for tubulovillous adenoma in the rectum, diverticular disease, proctitis and internal hemorrhoids, random biopsy of the  colon negative for colitis. Patient EGD positive for H hernia, reflux and H Pylori gastritis. Today good general state, with the complaint above. Patient with allergy to penicillin so will do treatment with Pylera and labs. Patient with likely IBD will need surveillance colonoscopy in 1 to 2 years.  7/16:  Patient with evidence of proctitis will need labs of IBD and will continue for now with Pentasa, will do labs and will follow closely, will need a colonoscopy in 1 year from the last one. Patient with gastritis positive for H pylori, did not take her treatment will try again, will follow in 2 months.  9/16:  Patient  Comes with improvement of her symptoms after H pylori treatment, will do urea breath test and will follow results, patient denies diarrhea or bleeding having regular bowel moments. Will follow in 2 months.  12/16 Patient had urea breath test that is negative. Patient doing well, will decrease dose of omeprazole. Will follow in few months.Will plan colonoscopy. Will give treatment for UC.  3/18: Patient states doing well with prn PPI, and on mesalamine. But did not have a colonoscopy as recommended on 2016 and had a test of occult blood that is positive. Will plan colonoscopy.  Will consider Lialda. Patient with low dose.  6/18:  Patient had a colonoscopy with evidence of colitis at the sigmoid colon, normal mucosa at the rectum and in the rest of the colon, hyperplastic polyp, diverticulosis and internal hemorrhoids. Patient had IBD panel that is negative.  Patient denies abdominal pain, mucus or blood in stool. Will continue low dose of mesalamine 400 mg daily will repeat a colonoscopy in 2 years.  3/18:  Patient states doing well with prn PPI, and on mesalamine. But did not have a colonoscopy as recommended on 2016 and had a test of occult blood that is positive. Will plan colonoscopy.  Will consider Lialda. Patient with low dose. Discussion [Use for free text]. Discussed dietary restrictions pertaining to Gastritis Information sheet reviewed and a copy provided. Discussed the need for appropriate follow-up care.  Patient will be placed in our recall system and a letter will be mailed to the patient. 10/23 Patient is here today in good general state, she is here for her surveillance colonoscopy her last colonoscopy was on 2018.  Patient has medical history of hyperplastic and tubulovillous adenoma of the rectum.  She has medical history of ulcerative colitis on Apriso 0.375 GM.  Today she is in good general state, denies any rectal bleeding, abdominal pain, diarrhea or any other GI symptoms. 12/23 Patient colonoscopy shows evidence of an 8 mm hyperplastic polyp into the descending colon, three 5 mm tubular adenoma polyp into the cecum, five 5 to 9 mm tubular adenoma polyp in the transverse colon, two 8 mm inflammatory polyp in the sigmoid colon, moderate diverticular disease of the sigmoid colon, and internal hemorrhoids.  Altered vascular, congested, erythematous, eroded mucosa in the rectosigmoid.  Biopsy results show evidence of rectosigmoid colon biopsy colonic mucosa with focal active chronic colitis, negative for granuloma, dysplasia, or malignancy. 6/24 Patient here today in good general state she denies any GI symptoms.  She has medical history of ulcerative colitis her last colonoscopy was done 6 months ago positive for adenoma polyp of the colon her next recall will be in 6 months.  Patient on Apriso 2 capsules in AM. Patient had laboratories to check on liver and kidney function, colonoscopy in 6 months.  Continue with Apriso 2 caps daily. 8/24 Patient here today with parents state she denies any GI bleeding, abdominal pain, diarrhea, constipation rectal pain or any other GI symptoms.  Patient laboratory shows evidence of normal kidney functions, normal liver functions, and normal CBC.  I see patient in 4 months to schedule her next colonoscopy.  12/24 Patient here today in good general state she is complaining having diarrhea are on and off mainly in the morning time negative for abdominal pain, blood, mucus, fever or any other GI symptoms.  Patient also complaining of symptoms of gastritis and reflux.  Her symptoms are chronic but now seems to be worse.  Patient has medical history of hiatal hernia GERD H. pylori s/p treatment.  Also, medical history of ulcerative colitis and colon tubular adenoma polyps. Patient will do diet for GERD start on famotidine. EGD. Colonoscopy. Diarrhea workup. We are going to do laboratories to check on TB, hepatitis B, kidney function, CBC.  We may change her to Tremfya open patient clinical course, and studies results.  04/25 Patient colonoscopy shows evidence of a small 6 mm hyperplastic polyp in the transverse colon, the cecum, transverse colon, descending and ascending colon are normal, diverticular disease of the sigmoid colon, erythematous, congested, eroded, and inflamed mucosa in the sigmoid colon.  The rectum is normal, and internal hemorrhoids.  Biopsy results: Right  colon biopsy with no significant normality, rectum biopsy with no significant abnormality, transverse colon hyperplastic polyp, colon sigmoid demonstrating patchy chronic active colitis consistent with partially treated ulcerative colitis. EGD demonstrating normal esophagus and examined duodenum, medium-sized hiatal hernia, nonerosive gastritis.  Biopsy results demonstrating duodenal mucosa stomach, antrum, and body biopsy with no significant abnormality.  Lower third esophageal mucosa with no significant abnormality. Patient refused to use Skyrizi, she rather takes tablets than injection, we will try to start her on Rinvoq. Today she is in good general states she has no GI complaints she has no more diarrhea, no abdominal pain, negative for rectal bleeding or mucus discharge.  Stool studies were negative.  Hepatitis B TB Gold were negative

## 2025-04-17 ENCOUNTER — TELEPHONE ENCOUNTER (OUTPATIENT)
Dept: URBAN - METROPOLITAN AREA CLINIC 63 | Facility: CLINIC | Age: 75
End: 2025-04-17

## 2025-04-17 RX ORDER — UPADACITINIB 15 MG/1
1 TABLET TABLET, EXTENDED RELEASE ORAL ONCE A DAY
Qty: 30 | Refills: 3 | OUTPATIENT
Start: 2025-04-17 | End: 2025-08-15

## 2025-04-17 RX ORDER — UPADACITINIB 45 MG/1
TAKE 1 TABLET BY MOUTH DAILY TABLET, EXTENDED RELEASE ORAL DAILY
Qty: 56 | Refills: 0 | OUTPATIENT
Start: 2025-04-17 | End: 2025-06-12

## 2025-04-23 NOTE — PHYSICAL EXAM NECK/THYROID:
Normal appearance, without tenderness upon palpation, no deformities, trachea midline, no masses , thyroid nontender.
decreased endurance

## 2025-05-09 ENCOUNTER — TELEPHONE ENCOUNTER (OUTPATIENT)
Dept: URBAN - METROPOLITAN AREA CLINIC 6 | Facility: CLINIC | Age: 75
End: 2025-05-09

## 2025-06-18 ENCOUNTER — TELEPHONE ENCOUNTER (OUTPATIENT)
Dept: URBAN - METROPOLITAN AREA CLINIC 60 | Facility: CLINIC | Age: 75
End: 2025-06-18

## 2025-06-18 RX ORDER — UPADACITINIB 15 MG/1
1 TABLET TABLET, EXTENDED RELEASE ORAL ONCE A DAY
Qty: 30 | Refills: 3
Start: 2025-04-17 | End: 2025-10-16

## 2025-06-19 ENCOUNTER — TELEPHONE ENCOUNTER (OUTPATIENT)
Dept: URBAN - METROPOLITAN AREA CLINIC 60 | Facility: CLINIC | Age: 75
End: 2025-06-19

## 2025-06-27 ENCOUNTER — TELEPHONE ENCOUNTER (OUTPATIENT)
Dept: URBAN - METROPOLITAN AREA CLINIC 60 | Facility: CLINIC | Age: 75
End: 2025-06-27

## 2025-07-01 ENCOUNTER — ERX REFILL RESPONSE (OUTPATIENT)
Dept: URBAN - METROPOLITAN AREA CLINIC 60 | Facility: CLINIC | Age: 75
End: 2025-07-01

## 2025-07-01 RX ORDER — MESALAMINE 1.2 G/1
2 TABLETS WITH A MEAL TABLET, DELAYED RELEASE ORAL ONCE A DAY
Qty: 180 TABLET | Refills: 0

## 2025-07-01 RX ORDER — FAMOTIDINE 40 MG/1
1 TABLET AT BEDTIME AS NEEDED TABLET, FILM COATED ORAL ONCE A DAY
Qty: 90 TABLET | Refills: 0

## 2025-07-14 ENCOUNTER — TELEPHONE ENCOUNTER (OUTPATIENT)
Dept: URBAN - METROPOLITAN AREA CLINIC 60 | Facility: CLINIC | Age: 75
End: 2025-07-14

## 2025-07-22 ENCOUNTER — TELEPHONE ENCOUNTER (OUTPATIENT)
Dept: URBAN - METROPOLITAN AREA CLINIC 63 | Facility: CLINIC | Age: 75
End: 2025-07-22

## 2025-07-22 RX ORDER — MESALAMINE 1.2 G/1
2 TABLETS WITH A MEAL TABLET, DELAYED RELEASE ORAL ONCE A DAY
Qty: 180 TABLET | Refills: 0